# Patient Record
Sex: FEMALE | Race: WHITE | URBAN - METROPOLITAN AREA
[De-identification: names, ages, dates, MRNs, and addresses within clinical notes are randomized per-mention and may not be internally consistent; named-entity substitution may affect disease eponyms.]

---

## 2018-07-03 ENCOUNTER — OFFICE VISIT (OUTPATIENT)
Dept: URGENT CARE | Facility: URGENT CARE | Age: 28
End: 2018-07-03

## 2018-07-03 ENCOUNTER — RADIANT APPOINTMENT (OUTPATIENT)
Dept: GENERAL RADIOLOGY | Facility: CLINIC | Age: 28
End: 2018-07-03
Attending: PHYSICIAN ASSISTANT

## 2018-07-03 VITALS
DIASTOLIC BLOOD PRESSURE: 66 MMHG | HEART RATE: 66 BPM | OXYGEN SATURATION: 99 % | SYSTOLIC BLOOD PRESSURE: 102 MMHG | WEIGHT: 201 LBS | TEMPERATURE: 98.5 F

## 2018-07-03 DIAGNOSIS — M79.645 PAIN OF FINGER OF LEFT HAND: ICD-10-CM

## 2018-07-03 DIAGNOSIS — M65.4 DE QUERVAIN'S TENOSYNOVITIS, RIGHT: Primary | ICD-10-CM

## 2018-07-03 DIAGNOSIS — F17.209 TOBACCO USE DISORDER, CONTINUOUS: ICD-10-CM

## 2018-07-03 PROCEDURE — 73130 X-RAY EXAM OF HAND: CPT | Mod: RT

## 2018-07-03 PROCEDURE — 99203 OFFICE O/P NEW LOW 30 MIN: CPT | Performed by: PHYSICIAN ASSISTANT

## 2018-07-03 RX ORDER — IBUPROFEN 800 MG/1
800 TABLET, FILM COATED ORAL EVERY 8 HOURS PRN
Qty: 30 TABLET | Refills: 1 | Status: SHIPPED | OUTPATIENT
Start: 2018-07-03

## 2018-07-03 NOTE — MR AVS SNAPSHOT
After Visit Summary   7/3/2018    Shireen Mills    MRN: [id number]           Patient Information     Date Of Birth          1990        Visit Information        Provider Department      7/3/2018 4:45 PM Андрей Church PA-C [facility]        Today's Diagnoses     Pain of finger of left hand    -  1    De Quervain's tenosynovitis, right          Care Instructions          Treating De Quervain Tenosynovitis  The goal of your treatment is to ease your pain and allow you to use your thumb again. Treatment will depend on how bad the pain is.  Nonsurgical Treatment  Just taking a break from the activities that caused your pain may be enough. Your healthcare provider may also have you take nonsteroidal, anti-inflammatory medicine (NSAIDs), such as ibuprofen. Or you may wear a splint for a few weeks to rest the thumb and wrist. To lesson swelling, your healthcare provider may inject an anti-inflammatory medicine, such as cortisone, around the tendons. You may have more pain at first. But in a few days your thumb should feel better.    Surgery  If other kinds of treatment don t ease your pain, or if the pain is bad, you may need surgery. The sheath that surrounds the tendons is released so the tendons can move more easily. This helps reduce the inflammation. It also allows you to straighten your thumb without pain. Surgery is done with local or regional anesthetic on an outpatient basis. So you can go home the same day. You will likely have a splint or dressing on your wrist for a few days while the tissue heals. You may need physical therapy to help strengthen muscles. Your healthcare provider will talk with you about the risks and possible complications of surgery.  Date Last Reviewed: 1/1/2018 2000-2017 [employer]. [address] [place] [address]. All rights reserved. This information is not intended as a substitute for professional medical care.  Always follow your healthcare professional's instructions.                Follow-ups after your visit        Additional Services     ORTHO  REFERRAL       [facility] is referring you to the Orthopedic  Services at [facility].       The  Representative will assist you in the coordination of your Orthopedic and Musculoskeletal Care as prescribed by your physician.    The  Representative will call you within 1 business day to help schedule your appointment, or you may contact the  Representative at:    All areas ~ [phone]     Type of Referral : Non Surgical       Timeframe requested: Within 2 weeks    Coverage of these services is subject to the terms and limitations of your health insurance plan.  Please call member services at your health plan with any benefit or coverage questions.      If X-rays, CT or MRI's have been performed, please contact the facility where they were done to arrange for , prior to your scheduled appointment.  Please bring this referral request to your appointment and present it to your specialist.                  Who to contact     If you have questions or need follow up information about today's clinic visit or your schedule please contact [facility] directly at [phone].  Normal or non-critical lab and imaging results will be communicated to you by MyChart, letter or phone within 4 business days after the clinic has received the results. If you do not hear from us within 7 days, please contact the clinic through MyChart or phone. If you have a critical or abnormal lab result, we will notify you by phone as soon as possible.  Submit refill requests through [employer] or call your pharmacy and they will forward the refill request to us. Please allow 3 business days for your refill to be completed.          Additional Information About Your Visit        MyChart Information      "[employer] lets you send messages to your doctor, view your test results, renew your prescriptions, schedule appointments and more. To sign up, go to [email or web address] . Click on \"Log in\" on the left side of the screen, which will take you to the Welcome page. Then click on \"Sign up Now\" on the right side of the page.     You will be asked to enter the access code listed below, as well as some personal information. Please follow the directions to create your username and password.     Your access code is: [email or web address]  Expires: 10/1/2018  6:42 PM     Your access code will  in 90 days. If you need help or a new code, please call your [place] clinic or [phone].        Care EveryWhere ID     This is your Care EveryWhere ID. This could be used by other organizations to access your [place] medical records  [id number]        Your Vitals Were     Pulse Temperature Pulse Oximetry             66 98.5  F (36.9  C) (Tympanic) 99%          Blood Pressure from Last 3 Encounters:   18 102/66    Weight from Last 3 Encounters:   18 201 lb (91.2 kg)              We Performed the Following     ORTHO  REFERRAL          Today's Medication Changes          These changes are accurate as of 7/3/18  6:42 PM.  If you have any questions, ask your nurse or doctor.               Start taking these medicines.        Dose/Directions    ibuprofen 800 MG tablet   Commonly known as:  ADVIL/MOTRIN   Used for:  Pain of finger of left hand   Started by:  Андрей Church PA-C        Dose:  800 mg   Take 1 tablet (800 mg) by mouth every 8 hours as needed for moderate pain   Quantity:  30 tablet   Refills:  1       order for DME   Used for:  Pain of finger of left hand   Started by:  Андрей Church PA-C        Equipment being ordered: thumb spica splint   Quantity:  1 Device   Refills:  0            Where to get your medicines      These medications were sent to [id number] - [place], " [place] - [address]  [address] [place] [address]     Phone:  [phone]     ibuprofen 800 MG tablet         Some of these will need a paper prescription and others can be bought over the counter.  Ask your nurse if you have questions.     Bring a paper prescription for each of these medications     order for DME                Primary Care Provider Fax #    Physician No Ref-Primary [phone]       No address on file        Equal Access to Services     INGRID JESUS : Hadii aad ku hadasho Soomaali, waaxda luqadaha, qaybta kaalmada adeegyada, waxay manein lelian adeshamar wells laAndreswill bacon. So [facility] [phone].    ATENCIÓN: Si zenon maldonado, tiene a tobias disposición servicios gratuitos de asistencia lingüística. Llame al [phone].    We comply with applicable federal civil rights laws and [place] laws. We do not discriminate on the basis of race, color, national origin, age, disability, sex, sexual orientation, or gender identity.            Thank you!     Thank you for choosing [facility]  for your care. Our goal is always to provide you with excellent care. Hearing back from our patients is one way we can continue to improve our services. Please take a few minutes to complete the written survey that you may receive in the mail after your visit with us. Thank you!             Your Updated Medication List - Protect others around you: Learn how to safely use, store and throw away your medicines at www.disposemymeds.org.          This list is accurate as of 7/3/18  6:42 PM.  Always use your most recent med list.                   Brand Name Dispense Instructions for use Diagnosis    ibuprofen 800 MG tablet    ADVIL/MOTRIN    30 tablet    Take 1 tablet (800 mg) by mouth every 8 hours as needed for moderate pain    Pain of finger of left hand       order for DME     1 Device    Equipment being ordered: thumb spica splint    Pain of finger of left hand

## 2018-07-03 NOTE — PATIENT INSTRUCTIONS
Treating De Quervain Tenosynovitis  The goal of your treatment is to ease your pain and allow you to use your thumb again. Treatment will depend on how bad the pain is.  Nonsurgical Treatment  Just taking a break from the activities that caused your pain may be enough. Your healthcare provider may also have you take nonsteroidal, anti-inflammatory medicine (NSAIDs), such as ibuprofen. Or you may wear a splint for a few weeks to rest the thumb and wrist. To lesson swelling, your healthcare provider may inject an anti-inflammatory medicine, such as cortisone, around the tendons. You may have more pain at first. But in a few days your thumb should feel better.    Surgery  If other kinds of treatment don t ease your pain, or if the pain is bad, you may need surgery. The sheath that surrounds the tendons is released so the tendons can move more easily. This helps reduce the inflammation. It also allows you to straighten your thumb without pain. Surgery is done with local or regional anesthetic on an outpatient basis. So you can go home the same day. You will likely have a splint or dressing on your wrist for a few days while the tissue heals. You may need physical therapy to help strengthen muscles. Your healthcare provider will talk with you about the risks and possible complications of surgery.  Date Last Reviewed: 1/1/2018 2000-2017 [employer]. [address] [place] [address], [place] [address]. All rights reserved. This information is not intended as a substitute for professional medical care. Always follow your healthcare professional's instructions.

## 2018-07-05 NOTE — PROGRESS NOTES
SUBJECTIVE:  Chief Complaint   Patient presents with     Musculoskeletal Problem     right arm pain x 5 days, no improvement, no injury, finger tips tingly, throbbing, mobility issues-grasping objects      Shireen Mills is a 28 year old female who presents with a chief complaint of right finger  first pain, tenderness and decreased range of motion.  Symptoms began 2 day(s) ago, are moderate and worsening  Context:  Injury:No.  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 Pain exacerbated by grasping, moving thumb Relieved by rest.  She treated it initially with no therapy. This is the first time this type of injury has occurred to this patient.     History reviewed. No pertinent past medical history.  Current Outpatient Prescriptions   Medication Sig Dispense Refill     ibuprofen (ADVIL/MOTRIN) 800 MG tablet Take 1 tablet (800 mg) by mouth every 8 hours as needed for moderate pain 30 tablet 1     order for DME Equipment being ordered: thumb spica splint 1 Device 0     Social History   Substance Use Topics     Smoking status: Current Every Day Smoker     Types: Cigarettes     Smokeless tobacco: Never Used     Alcohol use Yes      Comment: rarely        ROS:  10 point ROS negative except as listed above      EXAM:   /66 (BP Location: Left arm, Patient Position: Sitting, Cuff Size: Adult Large)  Pulse 66  Temp 98.5  F (36.9  C) (Tympanic)  Wt 201 lb (91.2 kg)  SpO2 99%  M/S Exam:positive finklestein test.    tenderness to palpationGENERAL APPEARANCE: healthy, alert and no distress  CHEST: clear to auscultation  CV: regular rate and rhythm  EXTREMITIES: peripheral pulses normal  MS: no gross deformities noted, no evidence of inflammation in joints, FROM in all extremities.  SKIN: no suspicious lesions or rashes  NEURO: Normal strength and tone, sensory exam grossly normal, mentation intact and speech normal    X-RAY was done. WNL  ASSESSMENT:  (M65.4) De Quervain's tenosynovitis, right  (primary  encounter diagnosis)  Plan: ORTHO  REFERRAL      (M79.645) Pain of finger of left hand  Plan: XR Hand Right G/E 3 Views, order for DME,         ibuprofen (ADVIL/MOTRIN) 800 MG tablet      (F17.209) Tobacco use disorder, continuous  Comment: discussed effect smoking has on the body's ability to heal  Plan: Quit smoking      PLAN:  See orders in epic

## 2022-07-04 ENCOUNTER — NURSE TRIAGE (OUTPATIENT)
Dept: NURSING | Facility: CLINIC | Age: 32
End: 2022-07-04

## 2022-07-04 NOTE — TELEPHONE ENCOUNTER
Triage Call:    Caller: Patient    Patient lives outside of of [place], but is in [place] today.She is wanting to know about the [place] [facility] and orthopedic care.  Reviewed that Urgent Care doesn't have ortho specialists, but there are ortho urgent Care's in the [facility] that she could look into.  She asked if the UC's will do steriod injections and advised that they do not do those.       Home care recommended disposition.  Patient verbalized understanding about recommendations and disposition.  Encouraged to call back with any further questions.      Bhumi Jimenez RN on 7/4/2022 at 10:10 AM      COVID 19 Nurse Triage Plan/Patient Instructions    Please be aware that novel coronavirus (COVID-19) may be circulating in the community. If you develop symptoms such as fever, cough, or SOB or if you have concerns about the presence of another infection including coronavirus (COVID-19), please contact your health care provider or visit [email or web address].     Disposition/Instructions    Home care recommended. Follow home care protocol based instructions.    Thank you for taking steps to prevent the spread of this virus.  o Limit your contact with others.  o Wear a simple mask to cover your cough.  o Wash your hands well and often.    Resources    [facility]: About COVID-19: [email or web address]    CDC: What to Do If You're Sick: www.cdc.gov/coronavirus/2019-ncov/about/steps-when-sick.html    CDC: Ending Home Isolation: www.cdc.gov/coronavirus/2019-ncov/hcp/disposition-in-home-patients.html     CDC: Caring for Someone: www.cdc.gov/coronavirus/2019-ncov/if-you-are-sick/care-for-someone.html     [facility]: Interim Guidance for Hospital Discharge to Home: www.health.[facility].[place]/diseases/coronavirus/hcp/hospdischarge.pdf    [facility] clinical trials (COVID-19 research studies): clinicalaffairs.[facility]/umn-clinical-trials     Below are the COVID-19 hotlines at the [facility]  ([facility]). Interpreters are available.   o For health questions: Call 881-721-9753 or 1-283.476.3128 (7 a.m. to 7 p.m.)  o For questions about schools and childcare: Call 434-021-3155 or 1-784.344.2255 (7 a.m. to 7 p.m.)                   Reason for Disposition    Health Information question, no triage required and triager able to answer question    Additional Information    Negative: [1] Caller is not with the adult (patient) AND [2] reporting urgent symptoms    Negative: Lab result questions    Negative: Medication questions    Negative: Caller can't be reached by phone    Negative: Caller has already spoken to PCP or another triager    Negative: RN needs further essential information from caller in order to complete triage    Negative: Requesting regular office appointment    Negative: [1] Caller requesting NON-URGENT health information AND [2] PCP's office is the best resource    Protocols used: INFORMATION ONLY CALL - NO TRIAGE-A-

## 2023-04-14 ENCOUNTER — OCCUPATIONAL HEALTH (OUTPATIENT)
Dept: URGENT CARE | Facility: CLINIC | Age: 33
End: 2023-04-14

## 2023-04-14 DIAGNOSIS — Z02.89 ENCOUNTER FOR EXAMINATION REQUIRED BY DEPARTMENT OF TRANSPORTATION (DOT): Primary | ICD-10-CM

## 2023-04-14 PROCEDURE — 99499 UNLISTED E&M SERVICE: CPT | Mod: S$GLB,,, | Performed by: FAMILY MEDICINE

## 2023-04-14 PROCEDURE — 99499 PHYSICAL, RECERT DOT/CDL: ICD-10-PCS | Mod: S$GLB,,, | Performed by: FAMILY MEDICINE
